# Patient Record
Sex: FEMALE | Race: BLACK OR AFRICAN AMERICAN | Employment: FULL TIME | URBAN - METROPOLITAN AREA
[De-identification: names, ages, dates, MRNs, and addresses within clinical notes are randomized per-mention and may not be internally consistent; named-entity substitution may affect disease eponyms.]

---

## 2017-01-31 ENCOUNTER — HOSPITAL ENCOUNTER (OUTPATIENT)
Dept: PHYSICAL THERAPY | Age: 22
Discharge: HOME | End: 2017-01-31
Payer: COMMERCIAL

## 2017-01-31 PROCEDURE — 97110 THERAPEUTIC EXERCISES: CPT

## 2017-01-31 PROCEDURE — 97161 PT EVAL LOW COMPLEX 20 MIN: CPT

## 2017-01-31 NOTE — PROGRESS NOTES
PHYSICAL THERAPY - DAILY TREATMENT NOTE    Patient Name: Angel Talamantes        Date: 2017  : 1995   yes Patient  Verified  Visit #:   1   of   8  Insurance: Payor: Rani Ellison / Plan: [address] PT / Product Type: Commerical /      In time: 8:05 Out time: 8:38   Total Treatment Time: 33     Medicare Time Tracking (below)   Total Timed Codes (min):  na 1:1 Treatment Time:  na     TREATMENT AREA =  Right knee pain [M25.561]    SUBJECTIVE  Pain Level (on 0 to 10 scale):  6   10   Medication Changes/New allergies or changes in medical history, any new surgeries or procedures?    no  If yes, update Summary List   Subjective Functional Status/Changes:  []  No changes reported     See POC          OBJECTIVE  8 min Therapeutic Exercise:  [x]  See flow sheet   Rationale:      increase ROM, increase strength and improve coordination to improve the patients ability to ambulate      min Patient Education:  yes  Reviewed HEP   []  Progressed/Changed HEP based on: Other Objective/Functional Measures:    Pt demo I w/ HEP     Post Treatment Pain Level (on 0 to 10) scale:   6   10     ASSESSMENT  Assessment/Changes in Function:     See POC     []  See Progress Note/Recertification   Patient will continue to benefit from skilled PT services to modify and progress therapeutic interventions, address functional mobility deficits, address ROM deficits, address strength deficits, analyze and address soft tissue restrictions, analyze and cue movement patterns, analyze and modify body mechanics/ergonomics, address imbalance/dizziness and instruct in home and community integration to attain remaining goals.    Progress toward goals / Updated goals:    See POC     PLAN  []  Upgrade activities as tolerated yes Continue plan of care   []  Discharge due to :    []  Other:      Therapist: Evens Hunter PT    Date: 2017 Time: 8:38 AM     Future Appointments  Date Time Provider Tevin Upton   2017 7:30 AM Raine Love, PT [address]
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
## 2017-02-02 ENCOUNTER — HOSPITAL ENCOUNTER (OUTPATIENT)
Dept: PHYSICAL THERAPY | Age: 22
Discharge: HOME | End: 2017-02-02
Payer: COMMERCIAL

## 2017-02-02 PROCEDURE — 97110 THERAPEUTIC EXERCISES: CPT

## 2017-02-02 PROCEDURE — 97140 MANUAL THERAPY 1/> REGIONS: CPT

## 2017-02-02 NOTE — PROGRESS NOTES
PHYSICAL THERAPY - DAILY TREATMENT NOTE    Patient Name: Araceli Kulkarni        Date: 2017  : 1995   yes Patient  Verified  Visit #:   2   of   8  Insurance: Payor: Neri TenKod / Plan:  [address] PT / Product Type: Commerical /      In time: 7:32 Out time: 8:12   Total Treatment Time: 40     Medicare Time Tracking (below)   Total Timed Codes (min):  na 1:1 Treatment Time:  na     TREATMENT AREA =  Right knee pain [M25.561]    SUBJECTIVE  Pain Level (on 0 to 10 scale):  4  / 10   Medication Changes/New allergies or changes in medical history, any new surgeries or procedures?    no  If yes, update Summary List   Subjective Functional Status/Changes:  []  No changes reported     Pt reports inc soreness R knee this visit but denies sharp pain or instability. Reports she has been compliant with HEP.           OBJECTIVE  Modalities Rationale:     decrease inflammation and decrease pain to improve patient's ability to ambulate, stand   min [] Estim, type/location:                                      []  att     []  unatt     []  w/US     []  w/ice    []  w/heat    min []  Mechanical Traction: type/lbs                   []  pro   []  sup   []  int   []  cont    []  before manual    []  after manual    min []  Ultrasound, settings/location:      min []  Iontophoresis w/ dexamethasone, location:                                               []  take home patch       []  in clinic   10 min [x]  Ice     []  Heat    location/position: To R knee in long sit    min []  Vasopneumatic Device, press/temp:     min []  Other:    [x] Skin assessment post-treatment (if applicable):    [x]  intact    []  redness- no adverse reaction     []redness  adverse reaction:        22 min Therapeutic Exercise:  [x]  See flow sheet   Rationale:      increase ROM, increase strength, improve coordination and improve balance to improve the patients ability to ambulate     8 min Manual Therapy: STM to R VMO, RF, knee flexion PROM, HS str, inf patella mob   Rationale:      decrease pain, increase ROM, increase tissue extensibility and decrease trigger points to improve patient's ability to ambulate     min Patient Education:  yes  Reviewed HEP   []  Progressed/Changed HEP based on: Other Objective/Functional Measures:    Initiated therex to improve R knee strength/stability  TTP R distal RF     Post Treatment Pain Level (on 0 to 10) scale:   0  / 10     ASSESSMENT  Assessment/Changes in Function:     Pt reported dec pain post tx session. Reviewed DOMS and advised pt to ice PRN 10-15 min to reduce soreness. []  See Progress Note/Recertification   Patient will continue to benefit from skilled PT services to modify and progress therapeutic interventions, address functional mobility deficits, address ROM deficits, address strength deficits, analyze and address soft tissue restrictions, analyze and cue movement patterns, analyze and modify body mechanics/ergonomics, address imbalance/dizziness and instruct in home and community integration to attain remaining goals. Progress toward goals / Updated goals:    Reports progress to STG #1     PLAN  []  Upgrade activities as tolerated yes Continue plan of care   []  Discharge due to :    []  Other:      Therapist: Jessica Hein, PT    Date: 2/2/2017 Time: 7:34 AM     No future appointments.

## 2017-02-09 ENCOUNTER — HOSPITAL ENCOUNTER (OUTPATIENT)
Dept: PHYSICAL THERAPY | Age: 22
Discharge: HOME | End: 2017-02-09
Payer: COMMERCIAL

## 2017-02-09 PROCEDURE — 97110 THERAPEUTIC EXERCISES: CPT

## 2017-02-09 PROCEDURE — 97140 MANUAL THERAPY 1/> REGIONS: CPT

## 2017-02-09 NOTE — PROGRESS NOTES
PHYSICAL THERAPY - DAILY TREATMENT NOTE    Patient Name: Cathy Bynum        Date: 2017  : 1995   YES Patient  Verified  Visit #:   3   of   8  Insurance: Payor: Oliver Do / Plan: [address] / Product Type: Commerical /      In time: 855 Out time: 930   Total Treatment Time: 35     Medicare Time Tracking (below)   Total Timed Codes (min):  na 1:1 Treatment Time:  na     TREATMENT AREA =  Right knee pain [M25.561]    SUBJECTIVE  Pain Level (on 0 to 10 scale):  3  / 10   Medication Changes/New allergies or changes in medical history, any new surgeries or procedures? NO    If yes, update Summary List   Subjective Functional Status/Changes:  []  No changes reported     Patient states her knee is still sore, however feels she is doing better since her LV. OBJECTIVE      25 min Therapeutic Exercise:  [x]  See flow sheet   Rationale:      increase ROM and increase strength to improve the patients ability to perform walking activities. 10 min Manual Therapy: STM to R quadriceps tendon, R RF, R VMO; R inferior patellar mobs, R HS stretch   Rationale:      decrease pain, increase ROM, increase tissue extensibility and decrease trigger points to improve patient's ability to perform standing activities. min Patient Education:  YES  Reviewed HEP   []  Progressed/Changed HEP based on: Other Objective/Functional Measures:    Tenderness reported to R VMO during MT, minimal restriction to patellar mobility  Continues to demonstrate altered gait mechanics secondary to pain and apprehension     Post Treatment Pain Level (on 0 to 10) scale:   0  / 10     ASSESSMENT  Assessment/Changes in Function:     Patient reported reduced pain post session. Patient educated to continue with HEP at this time and will progress when appropriate.       []  See Progress Note/Recertification   Patient will continue to benefit from skilled PT services to modify and progress therapeutic interventions, address functional mobility deficits, address ROM deficits, address strength deficits, analyze and address soft tissue restrictions, analyze and cue movement patterns, analyze and modify body mechanics/ergonomics and assess and modify postural abnormalities to attain remaining goals. Progress toward goals / Updated goals:    Progressing toward STG#3 per manual therapy treatment.       PLAN  [x]  Upgrade activities as tolerated YES Continue plan of care   []  Discharge due to :    []  Other:      Therapist: Brody Bragg PT    Date: 2/9/2017 Time: 9:39 AM     Future Appointments  Date Time Provider Tevin Upton   2/14/2017 11:00 AM Farhad FIGUEROA, PT [facility]   2/16/2017 7:30 AM James Kingsley Naina, PT [facility]

## 2017-02-14 ENCOUNTER — HOSPITAL ENCOUNTER (OUTPATIENT)
Dept: PHYSICAL THERAPY | Age: 22
Discharge: HOME | End: 2017-02-14
Payer: COMMERCIAL

## 2017-02-14 PROCEDURE — 97110 THERAPEUTIC EXERCISES: CPT | Performed by: PHYSICAL THERAPIST

## 2017-02-14 PROCEDURE — 97140 MANUAL THERAPY 1/> REGIONS: CPT | Performed by: PHYSICAL THERAPIST

## 2017-02-16 ENCOUNTER — HOSPITAL ENCOUNTER (OUTPATIENT)
Dept: PHYSICAL THERAPY | Age: 22
End: 2017-02-16
Payer: COMMERCIAL

## 2017-02-22 ENCOUNTER — APPOINTMENT (OUTPATIENT)
Dept: PHYSICAL THERAPY | Age: 22
End: 2017-02-22
Payer: COMMERCIAL

## 2017-02-23 ENCOUNTER — HOSPITAL ENCOUNTER (OUTPATIENT)
Dept: PHYSICAL THERAPY | Age: 22
Discharge: HOME | End: 2017-02-23
Payer: COMMERCIAL

## 2017-02-23 PROCEDURE — 97110 THERAPEUTIC EXERCISES: CPT

## 2017-02-23 NOTE — PROGRESS NOTES
PHYSICAL THERAPY - DAILY TREATMENT NOTE    Patient Name: Reno Martinez        Date: 2017  : 1995   yes Patient  Verified  Visit #:   5   of   8  Insurance: Payor: Randell Be / Plan: [address] PT / Product Type: Commerical /      In time: 9:30 Out time: 10:03   Total Treatment Time: 33     Medicare Time Tracking (below)   Total Timed Codes (min):  na 1:1 Treatment Time:  na     TREATMENT AREA =  Right knee pain [M25.561]    SUBJECTIVE  Pain Level (on 0 to 10 scale):  2  / 10   Medication Changes/New allergies or changes in medical history, any new surgeries or procedures?    no  If yes, update Summary List   Subjective Functional Status/Changes:  []  No changes reported     Pt reports 80% overall improvement in sx, 4/10 max pain, 2-3/10 avg pain. Reports 1 instance of knee buckling when she was walking fast at work. Pt reports partial compliance to HEP. OBJECTIVE    33 min Therapeutic Exercise:  [x]  See flow sheet   Rationale:      increase ROM, increase strength, improve coordination and improve balance to improve the patients ability to squat      min Patient Education:  yes  Reviewed HEP   []  Progressed/Changed HEP based on: Other Objective/Functional Measures:    R knee AROM 0-136 deg  MMT: knee flex 5/5, ext 5/5, hip flex 5/5, abd 5/5, ext 4+/5, add 4/5  Demo's no weight shift minisquat and proper technique  FOTO: 72/100  GROC: +5     Post Treatment Pain Level (on 0 to 10) scale:   0  / 10     ASSESSMENT  Assessment/Changes in Function:     Pt demo significant functional improvement; DC to HEP at this time     []  See Progress Note/Recertification   Patient will continue to benefit from skilled PT services to n/a to attain remaining goals.    Progress toward goals / Updated goals:    See DC summary     PLAN  []  Upgrade activities as tolerated no Continue plan of care   [x]  Discharge due to : Goals met, program complete   []  Other:      Therapist: Demario Masterson Vincenzo, PT    Date: 2/23/2017 Time: 9:32 AM     No future appointments.

## 2017-02-24 NOTE — PROGRESS NOTES
[address] [facility] PHYSICAL THERAPY  [address] , [place], [address] - Phone: [phone]  Fax: [phone] SUMMARY  Patient Name: Tara Priest : 1995   Treatment/Medical Diagnosis: Right knee pain [M25.561]   Referral Source: Shyam Hernandez MD     Date of Initial Visit: 17 Attended Visits: 5 Missed Visits: 3     SUMMARY OF TREATMENT  Pt attended 5 sessions of PT over the course of 1 month. Tx has consisted of therex and manual tx to improve R LE ROM, strength, stability, and proprioception, as well as modalities PRN and pt education including HEP instruction. CURRENT STATUS  Pt reports 80% overall improvement in sx reporting 4/10 max pain, 2-3/1- avg pain. Pt reports only 1 instance of her knee giving way in the last three weeks while she was walking fast at work. R knee AROM 0-136 deg. MMT R knee flex 5/5, ext 5/5, hip flex 5/5, abd 5/5, add 4/5, ext 4+/5. FOTO score 72/100, GROC +5. Pt has progressed steadily towards goals and is appropriate for DC to HEP at this time. Pt educated in appropriate gym activities to continue to complete in order to further improve upon LE strengths/stability. Goal/Measure of Progress Goal Met? 1. Improve R hip flexion and knee ext MMT to at least 4/5 to improve stability for ambulation and stair negotiation   Status at last Eval: Hip flex 3/5, knee ext 4-/5 Current Status: Hip flex 5/5, knee ext 5/5 yes   2. Improve FOTO score to >/= 67/100 to indicate improved function   Status at last Eval: 45/100 Current Status: 72/100 yes   3. Pt will denote at least +4 GROC score and be prepared for DC to HEP   Status at last Eval: n/a Current Status: +5 yes     RECOMMENDATIONS  Discontinue therapy. Progressing towards or have reached established goals. If you have any questions/comments please contact us directly at [phone].    Thank you for allowing us to assist in the care of your patient.     Therapist Signature: Padmini De Dios PT Date: 2/24/17     Time: 1:55 PM